# Patient Record
Sex: MALE | Race: WHITE | NOT HISPANIC OR LATINO | Employment: OTHER | URBAN - METROPOLITAN AREA
[De-identification: names, ages, dates, MRNs, and addresses within clinical notes are randomized per-mention and may not be internally consistent; named-entity substitution may affect disease eponyms.]

---

## 2019-03-26 ENCOUNTER — CONSULT (OUTPATIENT)
Dept: ENDOCRINOLOGY | Facility: HOSPITAL | Age: 79
End: 2019-03-26
Payer: MEDICARE

## 2019-03-26 VITALS
WEIGHT: 182.4 LBS | BODY MASS INDEX: 29.32 KG/M2 | DIASTOLIC BLOOD PRESSURE: 74 MMHG | HEART RATE: 68 BPM | SYSTOLIC BLOOD PRESSURE: 124 MMHG | HEIGHT: 66 IN

## 2019-03-26 DIAGNOSIS — E04.2 MULTIPLE THYROID NODULES: Primary | ICD-10-CM

## 2019-03-26 PROCEDURE — 99204 OFFICE O/P NEW MOD 45 MIN: CPT | Performed by: INTERNAL MEDICINE

## 2019-03-26 RX ORDER — SIMVASTATIN 20 MG
20 TABLET ORAL
COMMUNITY

## 2019-03-26 RX ORDER — ASPIRIN 81 MG/1
81 TABLET ORAL DAILY
COMMUNITY

## 2019-03-26 RX ORDER — MULTIVITAMIN
1 CAPSULE ORAL DAILY
COMMUNITY

## 2019-03-26 NOTE — LETTER
March 26, 2019     Yuri Waller DO  [address]    Patient: Norberto Woods   YOB: 1940   Date of Visit: 3/26/2019       Dear Dr Fanta Cortez:    Thank you for referring Norberto Woods to me for evaluation  Below are my notes for this consultation  If you have questions, please do not hesitate to call me  I look forward to following your patient along with you  Sincerely,        Liliam Moreno DO        CC: No Recipients  Liliam Moreno DO  3/26/2019  2:13 PM  Signed  3/26/2019    Assessment/Plan      Diagnoses and all orders for this visit:    Multiple thyroid nodules  -     TSH, 3rd generation Lab Collect  -     T4, free Lab Collect  -     US thyroid; Future  -     TSH, 3rd generation Lab Collect; Future  -     T4, free Lab Collect; Future  -     US thyroid; Future    Other orders  -     simvastatin (ZOCOR) 20 mg tablet; Take 20 mg by mouth daily at bedtime  -     aspirin (ECOTRIN LOW STRENGTH) 81 mg EC tablet; Take 81 mg by mouth daily  -     Multiple Vitamin (MULTIVITAMIN) capsule; Take 1 capsule by mouth daily        Assessment/Plan:  72-year-old male presents for incidentally discovered thyroid nodules consultation  In the thyroid ultrasound from [facility] in October of 2018, thyroid nodules are small do not warrant biopsy at this time  I have suggested we repeat a thyroid ultrasound in April of this year which will be a 6 month follow-up from her prior ultrasound  If there are no changes seen, we can continue to monitor with another ultrasound in about 1 year at which time we will see the patient in the office  He has not had recent thyroid function studies so I have asked him to go for a TSH and free T4 soon  He requested that this blood work be drawn with his cardiologist blood work at the end of April and I told him that would be fine  We will check a TSH and free T4 in 1 year which will be before his next appointment        CC: Thyroid consult    History of Present Illness     HPI: Alisha Andino is a 70y o  year old male with history of hyperlipidemia, CAD status post CABG, osteoarthritis, psoriasis who presents for consultation regarding incidentally discovered thyroid nodules  He had upper respiratory symptoms and underwent CT scan at which time they incidentally noted thyroid nodules  He has not had any neck compressive symptoms to attribute to the thyroid nodules  No dysphagia or odynophagia or hoarseness  He does have some postnasal drip  He does report a family history of thyroid dysfunction but denies known thyroid cancer specifically  He denies any personal history of head or neck radiation  He has not been treated for his thyroid in the past     Review of Systems   Constitutional: Negative for fatigue  HENT: Negative for trouble swallowing and voice change  Eyes: Negative for visual disturbance  Respiratory: Negative for shortness of breath  Cardiovascular: Negative for palpitations and leg swelling  Gastrointestinal: Negative for abdominal pain, nausea and vomiting  Endocrine: Negative for polydipsia and polyuria  Musculoskeletal: Negative for arthralgias and myalgias  Skin: Negative for rash  Neurological: Negative for dizziness, tremors and weakness  Hematological: Negative for adenopathy  Psychiatric/Behavioral: Negative for agitation and confusion  Historical Information   History reviewed  No pertinent past medical history  History reviewed  No pertinent surgical history    Social History   Social History     Substance and Sexual Activity   Alcohol Use Not on file     Social History     Substance and Sexual Activity   Drug Use Not on file     Social History     Tobacco Use   Smoking Status Passive Smoke Exposure - Never Smoker   Smokeless Tobacco Never Used     Family History:   Family History   Problem Relation Age of Onset    Heart disease Sister        Meds/Allergies   Current Outpatient Medications   Medication Sig Dispense Refill    aspirin (ECOTRIN LOW STRENGTH) 81 mg EC tablet Take 81 mg by mouth daily      Multiple Vitamin (MULTIVITAMIN) capsule Take 1 capsule by mouth daily      simvastatin (ZOCOR) 20 mg tablet Take 20 mg by mouth daily at bedtime       No current facility-administered medications for this visit  No Known Allergies    Objective   Vitals: Blood pressure 124/74, pulse 68, height 5' 6" (1 676 m), weight 82 7 kg (182 lb 6 4 oz)  Invasive Devices          None          Physical Exam   Constitutional: He is oriented to person, place, and time  He appears well-developed and well-nourished  No distress  HENT:   Head: Normocephalic and atraumatic  Neck: Normal range of motion  Neck supple  No thyromegaly present  Cardiovascular: Normal rate and regular rhythm  Pulmonary/Chest: Effort normal and breath sounds normal    Abdominal: Soft  Bowel sounds are normal    Musculoskeletal: Normal range of motion  He exhibits no edema  Neurological: He is alert and oriented to person, place, and time  He exhibits normal muscle tone  Skin: Skin is warm and dry  No rash noted  He is not diaphoretic  Psychiatric: He has a normal mood and affect  His behavior is normal    Vitals reviewed  The history was obtained from the review of the chart and from the patient  Lab Results:      Ultrasound of the thyroid from 10/18/2018 at [facility]:  In the right lobe of the thyroid there is a predominantly cystic nodule at the superior pole measuring 0 8 x 0 5 x 0 4 cm  There is a partially cystic nodule the midpole of the right lobe measuring 6 mm in greatest dimension  There is mixed echogenicity nodule of the lower pole measuring 1 1 x 0 8 x 0 9 cm  Central coarse calcifications could be question but are not seen on recent CT  In the left lobe of the thyroid the preliminary cystic nodule of the mid pole measures 4 mm in greatest dimension    Adjacent probably cystic nodule the midpole measures 5 mm greatest dimension  Hypoechoic 7 mm nodule in the inferior pole  Labs from 01/10/2019: White blood cells 8 9, hemoglobin 16 4, hematocrit 50 6, platelets 290, magnesium 2 2, glucose 105, BUN 14, creatinine 1 17, GFR greater than 60, sodium 140, potassium 4 4, calcium 9 8, bilirubin 0 8, AST 22, ALT 22, alk phos 67, total cholesterol 143, HDL 50, LDL 66, triglycerides 134  No future appointments  Portions of the record may have been created with voice recognition software  Occasional wrong word or "sound a like" substitutions may have occurred due to the inherent limitations of voice recognition software  Read the chart carefully and recognize, using context, where substitutions have occurred

## 2019-03-26 NOTE — PROGRESS NOTES
3/26/2019    Assessment/Plan      Diagnoses and all orders for this visit:    Multiple thyroid nodules  -     TSH, 3rd generation Lab Collect  -     T4, free Lab Collect  -     US thyroid; Future  -     TSH, 3rd generation Lab Collect; Future  -     T4, free Lab Collect; Future  -     US thyroid; Future    Other orders  -     simvastatin (ZOCOR) 20 mg tablet; Take 20 mg by mouth daily at bedtime  -     aspirin (ECOTRIN LOW STRENGTH) 81 mg EC tablet; Take 81 mg by mouth daily  -     Multiple Vitamin (MULTIVITAMIN) capsule; Take 1 capsule by mouth daily        Assessment/Plan:  72-year-old male presents for incidentally discovered thyroid nodules consultation  In the thyroid ultrasound from [facility] in October of 2018, thyroid nodules are small do not warrant biopsy at this time  I have suggested we repeat a thyroid ultrasound in April of this year which will be a 6 month follow-up from her prior ultrasound  If there are no changes seen, we can continue to monitor with another ultrasound in about 1 year at which time we will see the patient in the office  He has not had recent thyroid function studies so I have asked him to go for a TSH and free T4 soon  He requested that this blood work be drawn with his cardiologist blood work at the end of April and I told him that would be fine  We will check a TSH and free T4 in 1 year which will be before his next appointment  CC: Thyroid consult    History of Present Illness     HPI: Keyon Wong is a 70y o  year old male with history of hyperlipidemia, CAD status post CABG, osteoarthritis, psoriasis who presents for consultation regarding incidentally discovered thyroid nodules  He had upper respiratory symptoms and underwent CT scan at which time they incidentally noted thyroid nodules  He has not had any neck compressive symptoms to attribute to the thyroid nodules  No dysphagia or odynophagia or hoarseness  He does have some postnasal drip    He does report a family history of thyroid dysfunction but denies known thyroid cancer specifically  He denies any personal history of head or neck radiation  He has not been treated for his thyroid in the past     Review of Systems   Constitutional: Negative for fatigue  HENT: Negative for trouble swallowing and voice change  Eyes: Negative for visual disturbance  Respiratory: Negative for shortness of breath  Cardiovascular: Negative for palpitations and leg swelling  Gastrointestinal: Negative for abdominal pain, nausea and vomiting  Endocrine: Negative for polydipsia and polyuria  Musculoskeletal: Negative for arthralgias and myalgias  Skin: Negative for rash  Neurological: Negative for dizziness, tremors and weakness  Hematological: Negative for adenopathy  Psychiatric/Behavioral: Negative for agitation and confusion  Historical Information   History reviewed  No pertinent past medical history  History reviewed  No pertinent surgical history  Social History   Social History     Substance and Sexual Activity   Alcohol Use Not on file     Social History     Substance and Sexual Activity   Drug Use Not on file     Social History     Tobacco Use   Smoking Status Passive Smoke Exposure - Never Smoker   Smokeless Tobacco Never Used     Family History:   Family History   Problem Relation Age of Onset    Heart disease Sister        Meds/Allergies   Current Outpatient Medications   Medication Sig Dispense Refill    aspirin (ECOTRIN LOW STRENGTH) 81 mg EC tablet Take 81 mg by mouth daily      Multiple Vitamin (MULTIVITAMIN) capsule Take 1 capsule by mouth daily      simvastatin (ZOCOR) 20 mg tablet Take 20 mg by mouth daily at bedtime       No current facility-administered medications for this visit  No Known Allergies    Objective   Vitals: Blood pressure 124/74, pulse 68, height 5' 6" (1 676 m), weight 82 7 kg (182 lb 6 4 oz)    Invasive Devices          None          Physical Exam   Constitutional: He is oriented to person, place, and time  He appears well-developed and well-nourished  No distress  HENT:   Head: Normocephalic and atraumatic  Neck: Normal range of motion  Neck supple  No thyromegaly present  Cardiovascular: Normal rate and regular rhythm  Pulmonary/Chest: Effort normal and breath sounds normal    Abdominal: Soft  Bowel sounds are normal    Musculoskeletal: Normal range of motion  He exhibits no edema  Neurological: He is alert and oriented to person, place, and time  He exhibits normal muscle tone  Skin: Skin is warm and dry  No rash noted  He is not diaphoretic  Psychiatric: He has a normal mood and affect  His behavior is normal    Vitals reviewed  The history was obtained from the review of the chart and from the patient  Lab Results:      Ultrasound of the thyroid from 10/18/2018 at [facility]:  In the right lobe of the thyroid there is a predominantly cystic nodule at the superior pole measuring 0 8 x 0 5 x 0 4 cm  There is a partially cystic nodule the midpole of the right lobe measuring 6 mm in greatest dimension  There is mixed echogenicity nodule of the lower pole measuring 1 1 x 0 8 x 0 9 cm  Central coarse calcifications could be question but are not seen on recent CT  In the left lobe of the thyroid the preliminary cystic nodule of the mid pole measures 4 mm in greatest dimension  Adjacent probably cystic nodule the midpole measures 5 mm greatest dimension  Hypoechoic 7 mm nodule in the inferior pole  Labs from 01/10/2019: White blood cells 8 9, hemoglobin 16 4, hematocrit 50 6, platelets 699, magnesium 2 2, glucose 105, BUN 14, creatinine 1 17, GFR greater than 60, sodium 140, potassium 4 4, calcium 9 8, bilirubin 0 8, AST 22, ALT 22, alk phos 67, total cholesterol 143, HDL 50, LDL 66, triglycerides 134  No future appointments  Portions of the record may have been created with voice recognition software  Occasional wrong word or "sound a like" substitutions may have occurred due to the inherent limitations of voice recognition software  Read the chart carefully and recognize, using context, where substitutions have occurred

## 2020-03-16 ENCOUNTER — TELEPHONE (OUTPATIENT)
Dept: ENDOCRINOLOGY | Facility: HOSPITAL | Age: 80
End: 2020-03-16

## 2020-03-16 NOTE — TELEPHONE ENCOUNTER
Nothing urgent in blood work or ultrasound     Everything looks stable  We can discuss further at his appointment when he rescheduled

## 2020-03-16 NOTE — TELEPHONE ENCOUNTER
Patient said he would like to move his appointment 3-6 months out unless you see something critical on his bw and US both done at [facility]  Please advise if patient is ok to wait

## 2020-03-19 DIAGNOSIS — E04.2 MULTIPLE THYROID NODULES: Primary | ICD-10-CM

## 2020-03-19 NOTE — TELEPHONE ENCOUNTER
Pt rescheduled for 10/1/20  He does not want to come in sooner  Can you order labs before 10/1 appt   Can be mailed

## 2020-08-05 ENCOUNTER — TELEPHONE (OUTPATIENT)
Dept: ENDOCRINOLOGY | Facility: HOSPITAL | Age: 80
End: 2020-08-05

## 2020-08-05 NOTE — TELEPHONE ENCOUNTER
Received call from patient  Does he need a ultrasound before his 10/1/20 appointment or just lab work?

## 2020-10-01 ENCOUNTER — OFFICE VISIT (OUTPATIENT)
Dept: ENDOCRINOLOGY | Facility: HOSPITAL | Age: 80
End: 2020-10-01
Payer: MEDICARE

## 2020-10-01 VITALS
WEIGHT: 179.4 LBS | HEART RATE: 70 BPM | HEIGHT: 66 IN | BODY MASS INDEX: 28.83 KG/M2 | SYSTOLIC BLOOD PRESSURE: 124 MMHG | TEMPERATURE: 97.3 F | DIASTOLIC BLOOD PRESSURE: 80 MMHG

## 2020-10-01 DIAGNOSIS — E04.2 MULTIPLE THYROID NODULES: Primary | ICD-10-CM

## 2020-10-01 PROCEDURE — 99213 OFFICE O/P EST LOW 20 MIN: CPT | Performed by: INTERNAL MEDICINE

## 2020-10-07 ENCOUNTER — DOCUMENTATION (OUTPATIENT)
Dept: ENDOCRINOLOGY | Facility: HOSPITAL | Age: 80
End: 2020-10-07

## 2020-10-09 ENCOUNTER — TELEPHONE (OUTPATIENT)
Dept: ENDOCRINOLOGY | Facility: HOSPITAL | Age: 80
End: 2020-10-09

## 2021-04-07 ENCOUNTER — OFFICE VISIT (OUTPATIENT)
Dept: ENDOCRINOLOGY | Facility: HOSPITAL | Age: 81
End: 2021-04-07
Payer: MEDICARE

## 2021-04-07 VITALS
HEIGHT: 66 IN | DIASTOLIC BLOOD PRESSURE: 90 MMHG | HEART RATE: 76 BPM | SYSTOLIC BLOOD PRESSURE: 140 MMHG | WEIGHT: 179 LBS | BODY MASS INDEX: 28.77 KG/M2

## 2021-04-07 DIAGNOSIS — E04.2 MULTIPLE THYROID NODULES: Primary | ICD-10-CM

## 2021-04-07 PROBLEM — E78.5 HYPERLIPIDEMIA: Status: ACTIVE | Noted: 2021-04-07

## 2021-04-07 PROCEDURE — 99213 OFFICE O/P EST LOW 20 MIN: CPT | Performed by: INTERNAL MEDICINE

## 2021-04-07 RX ORDER — FLUTICASONE PROPIONATE 50 MCG
SPRAY, SUSPENSION (ML) NASAL EVERY 24 HOURS
COMMUNITY

## 2021-04-07 NOTE — PROGRESS NOTES
4/7/2021    Assessment/Plan      Diagnoses and all orders for this visit:    Multiple thyroid nodules        Assessment/Plan:    Thyroid nodules: These are small and low risk and appear cystic in nature  Recent thyroid function is normal   For now suggest we continue to monitor over time  Repeat thyroid blood work and thyroid ultrasound prior to next appointment which will be in 1 year  Asked him to call me sooner should he develop any thyroid neck compressive symptoms which we reviewed  CC: Follow-up    History of Present Illness     HPI: Keyon Wong is a 68y o  year old male with history of  Hyperlipidemia, CAD status post CABG, osteoarthritis, psoriasis who presents for a follow-up of incidentally discovered thyroid nodules  This was discovered on CT scan done for upper respiratory symptoms incidentally noting thyroid nodules  No neck compressive symptoms  There is a family history of thyroid dysfunction but no known family history of thyroid cancer  No dysphagia, odynophagia, hoarseness  No personal history of head or neck radiation  Presents today overall feeling well  No new health issues or symptoms of concern  No neck compressive symptoms  Review of Systems   Constitutional: Negative for fatigue  HENT: Negative for trouble swallowing and voice change  Eyes: Negative for visual disturbance  Respiratory: Negative for shortness of breath  Cardiovascular: Negative for palpitations and leg swelling  Gastrointestinal: Negative for abdominal pain, nausea and vomiting  Endocrine: Negative for polydipsia and polyuria  Musculoskeletal: Negative for arthralgias and myalgias  Skin: Negative for rash  Neurological: Negative for dizziness, tremors and weakness  Hematological: Negative for adenopathy  Psychiatric/Behavioral: Negative for agitation and confusion  Historical Information   No past medical history on file  No past surgical history on file    Social History   Social History     Substance and Sexual Activity   Alcohol Use None     Social History     Substance and Sexual Activity   Drug Use Not on file     Social History     Tobacco Use   Smoking Status Passive Smoke Exposure - Never Smoker   Smokeless Tobacco Never Used     Family History:   Family History   Problem Relation Age of Onset    Heart disease Sister        Meds/Allergies   Current Outpatient Medications   Medication Sig Dispense Refill    aspirin (ECOTRIN LOW STRENGTH) 81 mg EC tablet Take 81 mg by mouth daily      Multiple Vitamin (MULTIVITAMIN) capsule Take 1 capsule by mouth daily      simvastatin (ZOCOR) 20 mg tablet Take 20 mg by mouth daily at bedtime       No current facility-administered medications for this visit  No Known Allergies    Objective   Vitals: There were no vitals taken for this visit  Invasive Devices     None                 Physical Exam  Vitals signs reviewed  Constitutional:       General: He is not in acute distress  Appearance: He is well-developed  He is not diaphoretic  HENT:      Head: Normocephalic and atraumatic  Eyes:      Conjunctiva/sclera: Conjunctivae normal       Pupils: Pupils are equal, round, and reactive to light  Neck:      Musculoskeletal: Normal range of motion and neck supple  Thyroid: No thyromegaly  Cardiovascular:      Rate and Rhythm: Normal rate and regular rhythm  Pulmonary:      Effort: Pulmonary effort is normal  No respiratory distress  Breath sounds: Normal breath sounds  Abdominal:      General: Bowel sounds are normal       Palpations: Abdomen is soft  Musculoskeletal: Normal range of motion  Skin:     General: Skin is warm and dry  Findings: No rash  Neurological:      Mental Status: He is alert and oriented to person, place, and time  Motor: No abnormal muscle tone     Psychiatric:         Behavior: Behavior normal          The history was obtained from the review of the chart and from the patient  Lab Results:        Ultrasound of the thyroid from [facility] on 03/22/2021:   Four subcentimeter cystic lesions in the right lobe largest measuring 9 mm  No discrete nodules in the le  Ft lo  Be  Labs from [facility] on 03/22/2021:   TSH 3 06, free T4 1 2      Future Appointments   Date Time Provider Ty Diggs   4/7/2021  7:30 AM Mariela Causey DO [facility]       Portions of the record may have been created with voice recognition software  Occasional wrong word or "sound a like" substitutions may have occurred due to the inherent limitations of voice recognition software  Read the chart carefully and recognize, using context, where substitutions have occurred

## 2022-04-11 ENCOUNTER — OFFICE VISIT (OUTPATIENT)
Dept: ENDOCRINOLOGY | Facility: HOSPITAL | Age: 82
End: 2022-04-11
Payer: MEDICARE

## 2022-04-11 VITALS
HEIGHT: 66 IN | HEART RATE: 72 BPM | BODY MASS INDEX: 26.36 KG/M2 | SYSTOLIC BLOOD PRESSURE: 138 MMHG | WEIGHT: 164 LBS | DIASTOLIC BLOOD PRESSURE: 84 MMHG

## 2022-04-11 DIAGNOSIS — R79.89 ELEVATED TSH: ICD-10-CM

## 2022-04-11 DIAGNOSIS — E04.2 MULTIPLE THYROID NODULES: Primary | ICD-10-CM

## 2022-04-11 PROCEDURE — 99214 OFFICE O/P EST MOD 30 MIN: CPT | Performed by: INTERNAL MEDICINE

## 2022-04-11 NOTE — PROGRESS NOTES
4/11/2022    Assessment/Plan      Diagnoses and all orders for this visit:    Multiple thyroid nodules  -     TSH, 3rd generation; Future  -     T4, free; Future  -     TSH, 3rd generation; Future  -     T4, free; Future  -     US thyroid; Future    Elevated TSH  -     TSH, 3rd generation; Future  -     T4, free; Future  -     TSH, 3rd generation; Future  -     T4, free; Future        Assessment/Plan:  1  Thyroid nodules: These are cystic and low risk and unchanged overall  Discussed with patient that we could space out the interval between ultrasound evaluations but he is okay continuing to do these once year and acknowledges low risk nature of his nodules  He will call me sooner with any neck compressive symptoms of concern  2  Elevated TSH:  He does have a family history of thyroid dysfunction  Clinically is feeling well  We will trend this over time  Repeat labs in 6 months and we will call with the results  Follow-up in the office in 1 year  CC: Follow-up    History of Present Illness     HPI: Patricia Quiñonez is a 80y o  year old male with history of HLD, CAD S/P CABG, osteoarthritis, psoriasis who presents for a follow up of an incidentally discovered thyroid nodule  This was discovered on CT scan done for upper respiratory symptoms  He has not had neck compressive symptoms  There is no known family history of thyroid cancer  There is a family history of thyroid dysfunction though  No personal history of head or neck radiation  He presents today overall feeling well  He denies any dysphagia, odynophagia, hoarseness  He denies any concerns regarding energy level, temperature intolerance, palpitations, tachycardia  Overall he feels well  Review of Systems   Constitutional: Negative for fatigue  HENT: Negative for trouble swallowing and voice change  Eyes: Negative for visual disturbance  Respiratory: Negative for shortness of breath      Cardiovascular: Negative for palpitations and leg swelling  Gastrointestinal: Negative for abdominal pain, nausea and vomiting  Endocrine: Negative for polydipsia and polyuria  Musculoskeletal: Negative for arthralgias and myalgias  Skin: Negative for rash  Neurological: Negative for dizziness, tremors and weakness  Hematological: Negative for adenopathy  Psychiatric/Behavioral: Negative for agitation and confusion  Historical Information   History reviewed  No pertinent past medical history  History reviewed  No pertinent surgical history  Social History   Social History     Substance and Sexual Activity   Alcohol Use None     Social History     Substance and Sexual Activity   Drug Use Not on file     Social History     Tobacco Use   Smoking Status Passive Smoke Exposure - Never Smoker   Smokeless Tobacco Never Used     Family History:   Family History   Problem Relation Age of Onset    Heart disease Sister        Meds/Allergies   Current Outpatient Medications   Medication Sig Dispense Refill    aspirin (ECOTRIN LOW STRENGTH) 81 mg EC tablet Take 81 mg by mouth daily      fluticasone (FLONASE) 50 mcg/act nasal spray every 24 hours      Multiple Vitamin (MULTIVITAMIN) capsule Take 1 capsule by mouth daily      simvastatin (ZOCOR) 20 mg tablet Take 20 mg by mouth daily at bedtime       No current facility-administered medications for this visit  No Known Allergies    Objective   Vitals: Blood pressure 138/84, pulse 72, height 5' 6" (1 676 m), weight 74 4 kg (164 lb)  Invasive Devices  Report    None                 Physical Exam  Vitals reviewed  Constitutional:       General: He is not in acute distress  Appearance: He is well-developed  He is not diaphoretic  HENT:      Head: Normocephalic and atraumatic  Eyes:      Conjunctiva/sclera: Conjunctivae normal       Pupils: Pupils are equal, round, and reactive to light  Neck:      Thyroid: No thyromegaly     Cardiovascular:      Rate and Rhythm: Normal rate and regular rhythm  Pulmonary:      Effort: Pulmonary effort is normal  No respiratory distress  Breath sounds: Normal breath sounds  Abdominal:      General: Bowel sounds are normal       Palpations: Abdomen is soft  Musculoskeletal:         General: Normal range of motion  Cervical back: Normal range of motion and neck supple  Skin:     General: Skin is warm and dry  Findings: No rash  Neurological:      Mental Status: He is alert and oriented to person, place, and time  Motor: No abnormal muscle tone  Psychiatric:         Behavior: Behavior normal          The history was obtained from the review of the chart and from the patient  Lab Results:      Labs from [address] [place] [address] on 04/01/2022:   TSH 4 41     Ultrasound of the thyroid from 04/01/2022:   Small cyst seen in the right lobe representing colloid cysts largest measuring up to 1 cm  Tiny cysts measuring just a few mm in size in the left lobe  Future Appointments   Date Time Provider Ty Digsg   4/11/2022  7:30 AM Michael Rivera,  [facility]       Portions of the record may have been created with voice recognition software  Occasional wrong word or "sound a like" substitutions may have occurred due to the inherent limitations of voice recognition software  Read the chart carefully and recognize, using context, where substitutions have occurred

## 2022-10-28 ENCOUNTER — TELEPHONE (OUTPATIENT)
Dept: OTHER | Facility: OTHER | Age: 82
End: 2022-10-28

## 2022-10-28 NOTE — TELEPHONE ENCOUNTER
[facility] is requesting an order for [address] Thyroid faxed to Fax [phone]

## 2023-10-26 ENCOUNTER — OFFICE VISIT (OUTPATIENT)
Dept: ENDOCRINOLOGY | Facility: CLINIC | Age: 83
End: 2023-10-26
Payer: MEDICARE

## 2023-10-26 ENCOUNTER — TELEPHONE (OUTPATIENT)
Dept: ENDOCRINOLOGY | Facility: CLINIC | Age: 83
End: 2023-10-26

## 2023-10-26 VITALS
SYSTOLIC BLOOD PRESSURE: 126 MMHG | WEIGHT: 170 LBS | HEIGHT: 65 IN | BODY MASS INDEX: 28.32 KG/M2 | DIASTOLIC BLOOD PRESSURE: 74 MMHG

## 2023-10-26 DIAGNOSIS — E04.2 MULTIPLE THYROID NODULES: Primary | ICD-10-CM

## 2023-10-26 PROCEDURE — 99213 OFFICE O/P EST LOW 20 MIN: CPT | Performed by: INTERNAL MEDICINE

## 2023-10-26 RX ORDER — LOSARTAN POTASSIUM 50 MG/1
50 TABLET ORAL DAILY
COMMUNITY
Start: 2023-08-16

## 2023-10-26 RX ORDER — FUROSEMIDE 20 MG/1
20 TABLET ORAL DAILY
COMMUNITY
Start: 2023-08-16

## 2023-10-26 NOTE — PROGRESS NOTES
10/26/2023    Assessment/Plan      Diagnoses and all orders for this visit:    Multiple thyroid nodules  -     TSH, 3rd generation; Future  -     T4, free; Future  -     US thyroid; Future    Other orders  -     losartan (COZAAR) 50 mg tablet; Take 50 mg by mouth daily  -     furosemide (LASIX) 20 mg tablet; Take 20 mg by mouth daily        Assessment/Plan:  Thyroid nodules: We will acquire recent thyroid blood work and thyroid ultrasound review and call patient with results. Everything looks stable, plan for follow-up in 1 year which we will arrange and I have ordered thyroid blood work and ultrasound to be done next year. CC: Follow-up    History of Present Illness     HPI: Claudio Sinha is a 82y.o. year old male who presents for follow-up of thyroid nodules. These were incidentally discovered on CT scan in the past.  No neck compressive symptoms. No known family history of thyroid cancer. There is a family history of thyroid dysfunction of the. The patient does not have a personal history of head or neck radiation. He presents today overall feeling well. He states he had lab work thyroid ultrasound at [facility] recently but I do not have these records. No neck compressive symptoms. Review of Systems   Constitutional:  Negative for fatigue. HENT:  Negative for trouble swallowing and voice change. Eyes:  Negative for visual disturbance. Respiratory:  Negative for shortness of breath. Cardiovascular:  Negative for palpitations and leg swelling. Gastrointestinal:  Negative for abdominal pain, nausea and vomiting. Endocrine: Negative for polydipsia and polyuria. Musculoskeletal:  Negative for arthralgias and myalgias. Skin:  Negative for rash. Neurological:  Negative for dizziness, tremors and weakness. Hematological:  Negative for adenopathy. Psychiatric/Behavioral:  Negative for agitation and confusion. Historical Information   History reviewed.  No pertinent past medical history. History reviewed. No pertinent surgical history. Social History   Social History     Substance and Sexual Activity   Alcohol Use None     Social History     Substance and Sexual Activity   Drug Use Not on file     Social History     Tobacco Use   Smoking Status Never    Passive exposure: Yes   Smokeless Tobacco Never     Family History:   Family History   Problem Relation Age of Onset    Heart disease Sister        Meds/Allergies   Current Outpatient Medications   Medication Sig Dispense Refill    aspirin (ECOTRIN LOW STRENGTH) 81 mg EC tablet Take 81 mg by mouth daily      furosemide (LASIX) 20 mg tablet Take 20 mg by mouth daily      losartan (COZAAR) 50 mg tablet Take 50 mg by mouth daily      simvastatin (ZOCOR) 20 mg tablet Take 20 mg by mouth daily at bedtime      fluticasone (FLONASE) 50 mcg/act nasal spray every 24 hours (Patient not taking: Reported on 10/26/2023)      Multiple Vitamin (MULTIVITAMIN) capsule Take 1 capsule by mouth daily       No current facility-administered medications for this visit. No Known Allergies    Objective   Vitals: Blood pressure 126/74, height 5' 4.75" (1.645 m), weight 77.1 kg (170 lb). Invasive Devices       None                   Physical Exam  Vitals reviewed. Constitutional:       General: He is not in acute distress. Appearance: He is well-developed. He is not diaphoretic. HENT:      Head: Normocephalic and atraumatic. Eyes:      Conjunctiva/sclera: Conjunctivae normal.      Pupils: Pupils are equal, round, and reactive to light. Neck:      Thyroid: No thyromegaly. Cardiovascular:      Rate and Rhythm: Normal rate and regular rhythm. Pulmonary:      Effort: Pulmonary effort is normal. No respiratory distress. Breath sounds: Normal breath sounds. Abdominal:      General: Bowel sounds are normal.      Palpations: Abdomen is soft. Musculoskeletal:         General: Normal range of motion.       Cervical back: Normal range of motion and neck supple. Skin:     General: Skin is warm and dry. Findings: No rash. Neurological:      Mental Status: He is alert and oriented to person, place, and time. Motor: No abnormal muscle tone. Psychiatric:         Behavior: Behavior normal.         The history was obtained from the review of the chart and from the patient. Lab Results:      No updated records at this point      No future appointments. Portions of the record may have been created with voice recognition software. Occasional wrong word or "sound a like" substitutions may have occurred due to the inherent limitations of voice recognition software. Read the chart carefully and recognize, using context, where substitutions have occurred.

## 2023-10-26 NOTE — TELEPHONE ENCOUNTER
Patient had thyroid blood work and a thyroid ultrasound at [facility] recently. I don't have these reports yet. Can we please acquire them? Thanks!

## 2023-10-30 NOTE — TELEPHONE ENCOUNTER
Spoke to [employer] ([employer]) in [place] and will fax .     Entered labs from [facility] thank you

## 2024-05-14 ENCOUNTER — TELEPHONE (OUTPATIENT)
Age: 84
End: 2024-05-14

## 2024-05-23 ENCOUNTER — TELEPHONE (OUTPATIENT)
Age: 84
End: 2024-05-23

## 2024-09-25 ENCOUNTER — TELEPHONE (OUTPATIENT)
Age: 84
End: 2024-09-25

## 2024-09-25 NOTE — TELEPHONE ENCOUNTER
Patient calling asking if we can mail his lab and ultrasound slip to his home address. He is going to go to [facility] for both. Home address on file was verified.

## 2024-10-29 ENCOUNTER — OFFICE VISIT (OUTPATIENT)
Dept: ENDOCRINOLOGY | Facility: CLINIC | Age: 84
End: 2024-10-29
Payer: MEDICARE

## 2024-10-29 VITALS
HEIGHT: 66 IN | BODY MASS INDEX: 26.26 KG/M2 | WEIGHT: 163.4 LBS | DIASTOLIC BLOOD PRESSURE: 70 MMHG | HEART RATE: 80 BPM | SYSTOLIC BLOOD PRESSURE: 100 MMHG

## 2024-10-29 DIAGNOSIS — E04.2 MULTIPLE THYROID NODULES: Primary | ICD-10-CM

## 2024-10-29 PROBLEM — I50.9 CHF (CONGESTIVE HEART FAILURE) (HCC): Status: ACTIVE | Noted: 2024-10-29

## 2024-10-29 PROCEDURE — 99213 OFFICE O/P EST LOW 20 MIN: CPT | Performed by: INTERNAL MEDICINE

## 2024-10-29 RX ORDER — SPIRONOLACTONE 25 MG/1
25 TABLET ORAL DAILY
COMMUNITY

## 2024-10-29 RX ORDER — TORSEMIDE 20 MG/1
20 TABLET ORAL DAILY
COMMUNITY

## 2024-10-29 NOTE — PROGRESS NOTES
10/29/2024    Assessment & Plan      Diagnoses and all orders for this visit:    Multiple thyroid nodules    Other orders  -     torsemide (DEMADEX) 20 mg tablet; Take 20 mg by mouth daily  -     spironolactone (ALDACTONE) 25 mg tablet; Take 25 mg by mouth daily        Assessment/Plan:  Thyroid nodules: Thyroid blood work is normal.  Thyroid nodules appear low risk.  He has been having ultrasounds yearly which at this point is not necessary unless he notes any change in neck compressive symptoms which we reviewed.  An ultrasound every other year would be reasonable.  Because he is having closer evaluations with cardiology, thyroid function can be monitored periodically in conjunction with his lab work through his other providers.      CC: Follow-up    History of Present Illness     HPI: Napoleon Cruz is a 83 y.o. year old male who presents for a follow-up appointment.  He has a history of thyroid nodules that were incidentally discovered on CT scan in the past.  He has not had neck compressive symptoms.  There is no known family history of thyroid cancer.  There is a family history of thyroid dysfunction.  The patient does not have a personal history of head or neck radiation.  He presents for a follow-up visit overall feeling well.  His main medical concerns are related to CHF and he is following closely with cardiology at [place].    Review of Systems   Constitutional:  Negative for fatigue.   HENT:  Negative for trouble swallowing and voice change.    Eyes:  Negative for visual disturbance.   Respiratory:  Negative for shortness of breath.    Cardiovascular:  Negative for palpitations and leg swelling.   Gastrointestinal:  Negative for abdominal pain, nausea and vomiting.   Endocrine: Negative for polydipsia and polyuria.   Musculoskeletal:  Negative for arthralgias and myalgias.   Skin:  Negative for rash.   Neurological:  Negative for dizziness, tremors and weakness.   Hematological:  Negative for  "adenopathy.   Psychiatric/Behavioral:  Negative for agitation and confusion.        Historical Information   History reviewed. No pertinent past medical history.  History reviewed. No pertinent surgical history.  Social History   Social History     Substance and Sexual Activity   Alcohol Use Not Currently     Social History     Substance and Sexual Activity   Drug Use Never     Social History     Tobacco Use   Smoking Status Never    Passive exposure: Yes   Smokeless Tobacco Never     Family History:   Family History   Problem Relation Age of Onset    Heart disease Sister        Meds/Allergies   Current Outpatient Medications   Medication Sig Dispense Refill    aspirin (ECOTRIN LOW STRENGTH) 81 mg EC tablet Take 81 mg by mouth daily      losartan (COZAAR) 25 mg tablet Take 25 mg by mouth daily      simvastatin (ZOCOR) 20 mg tablet Take 20 mg by mouth daily at bedtime      spironolactone (ALDACTONE) 25 mg tablet Take 25 mg by mouth daily      torsemide (DEMADEX) 20 mg tablet Take 20 mg by mouth daily      fluticasone (FLONASE) 50 mcg/act nasal spray every 24 hours (Patient not taking: Reported on 10/26/2023)      furosemide (LASIX) 20 mg tablet Take 20 mg by mouth daily (Patient not taking: Reported on 10/29/2024)       No current facility-administered medications for this visit.     No Known Allergies    Objective   Vitals: Blood pressure 100/70, pulse 80, height 5' 6\" (1.676 m), weight 74.1 kg (163 lb 6.4 oz).  Invasive Devices       None                   Physical Exam  Vitals reviewed.   Constitutional:       General: He is not in acute distress.     Appearance: He is well-developed. He is not diaphoretic.   HENT:      Head: Normocephalic and atraumatic.   Eyes:      Conjunctiva/sclera: Conjunctivae normal.      Pupils: Pupils are equal, round, and reactive to light.   Neck:      Thyroid: No thyromegaly.   Cardiovascular:      Rate and Rhythm: Normal rate and regular rhythm.   Pulmonary:      Effort: Pulmonary " "effort is normal. No respiratory distress.      Breath sounds: Normal breath sounds.   Abdominal:      General: Bowel sounds are normal.      Palpations: Abdomen is soft.   Musculoskeletal:         General: Normal range of motion.      Cervical back: Normal range of motion and neck supple.   Skin:     General: Skin is warm and dry.      Findings: No rash.   Neurological:      Mental Status: He is alert and oriented to person, place, and time.      Motor: No abnormal muscle tone.   Psychiatric:         Behavior: Behavior normal.         The history was obtained from the review of the chart and from the patient.    Lab Results:      Labs from [facility] on 10/1/2024:  TSH 2.82    Ultrasound of the thyroid from [facility] on 10/1/2024:  In the right midpole is a 7 x 6 x 7 mm simple cyst.  In the right posterior midpole is a 5 x 6 x 7 mm simple cyst.  No nodule in the left lobe.      No future appointments.    Portions of the record may have been created with voice recognition software. Occasional wrong word or \"sound a like\" substitutions may have occurred due to the inherent limitations of voice recognition software. Read the chart carefully and recognize, using context, where substitutions have occurred.    "

## 2025-04-01 ENCOUNTER — TELEPHONE (OUTPATIENT)
Age: 85
End: 2025-04-01

## 2025-08-05 ENCOUNTER — TELEPHONE (OUTPATIENT)
Age: 85
End: 2025-08-05